# Patient Record
Sex: MALE | Race: WHITE
[De-identification: names, ages, dates, MRNs, and addresses within clinical notes are randomized per-mention and may not be internally consistent; named-entity substitution may affect disease eponyms.]

---

## 2017-04-05 ENCOUNTER — HOSPITAL ENCOUNTER (EMERGENCY)
Dept: HOSPITAL 63 - ER | Age: 32
Discharge: HOME | End: 2017-04-05
Payer: SELF-PAY

## 2017-04-05 VITALS
SYSTOLIC BLOOD PRESSURE: 127 MMHG | SYSTOLIC BLOOD PRESSURE: 127 MMHG | DIASTOLIC BLOOD PRESSURE: 65 MMHG | DIASTOLIC BLOOD PRESSURE: 65 MMHG

## 2017-04-05 DIAGNOSIS — M25.531: Primary | ICD-10-CM

## 2017-04-05 DIAGNOSIS — Z88.5: ICD-10-CM

## 2017-04-05 DIAGNOSIS — Z88.6: ICD-10-CM

## 2017-04-05 DIAGNOSIS — Z88.8: ICD-10-CM
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
## 2017-04-05 DIAGNOSIS — F17.200: ICD-10-CM
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
## 2017-04-05 DIAGNOSIS — Z88.1: ICD-10-CM
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
## 2017-04-05 DIAGNOSIS — F20.9: ICD-10-CM

## 2017-04-05 PROCEDURE — 29125 APPL SHORT ARM SPLINT STATIC: CPT

## 2017-04-05 RX ADMIN — OLANZAPINE ONE MG: 2.5 TABLET, FILM COATED ORAL at 09:15

## 2017-04-05 NOTE — ED.ADGEN
Past History


Past Medical History:  Schizophrenia, Other


Past Surgical History:  Tonsillectomy


Smoking:  Less than 1pk/day


Alcohol Use:  Occasionally


Additional Alcohol Information:  


states "it's been a long time"


Drug Use:  Other


Social History Narrative:  states he "used a little of everything", denies 

current use





Adult General


HPI


HPI





Patient is a 31-year-old male presents emergency department complaining of 

right wrist and arm pain. Patient has a history of schizophrenia states he has 

been out of his medications. He is denying any auditory hallucinations but 

reports that he frequently sees snakes in that "freaks him out." He states that 

he hurt the arm all change a tire on a truck last night. The liver of the brad 

flipped back and hit him in the right wrist. He has had no prehospital 

intervention. He is asking for dose of his olanzapine as well as a 

prescription. He states [facility] and will follow-

[facility] with [facility].





Review of Systems


Review of Systems





Constitutional: Denies fever or chills []


Eyes: Denies change in visual acuity, redness, or eye pain []


HENT: Denies nasal congestion or sore throat []


Respiratory: Denies cough or shortness of breath []


Cardiovascular: No additional information not addressed in HPI []


GI: Denies abdominal pain, nausea, vomiting, bloody stools or diarrhea []


: Denies dysuria or hematuria []


Musculoskeletal: Denies back pain or joint pain []


Integument: Denies rash or skin lesions []


Neurologic: Denies headache, focal weakness or sensory changes []


Endocrine: Denies polyuria or polydipsia []





Current Medications


Current Medications





 Current Medications








 Medications


  (Trade)  Dose


 Ordered  Sig/Domingo  Start Time


 Stop Time Status Last Admin


Dose Admin


 


 Olanzapine


  (Zyprexa Zydis)  10 mg  1X  ONCE  4/5/17 09:30


 4/5/17 09:31 DC  


 


 


 Olanzapine


  (Zyprexa)  10 mg  1X  ONCE  4/5/17 11:15


 4/5/17 11:16 DC 4/5/17 09:15


10 MG











Allergies


Allergies





 Allergies








Coded Allergies Type Severity Reaction Last Updated Verified


 


  Haloperidol Lactate Allergy Unknown swelling 4/16/16 Yes


 


  haloperidol Allergy Unknown swelling 4/16/16 Yes


 


  sulfamethoxazole Allergy Unknown hives 4/16/16 Yes


 


  tramadol Allergy Unknown swelling 4/16/16 Yes


 


  trimethoprim Allergy Unknown hives 4/16/16 Yes


 


Uncoded Allergies Type Severity Reaction Last Updated Verified


 


  narcotics Allergy Unknown  4/5/17 











Physical Exam


Physical Exam





Constitutional: Well developed, well nourished, no acute distress, non-toxic 

appearance. []


HENT: Normocephalic, atraumatic, bilateral external ears normal, oropharynx 

moist, no oral exudates, nose normal. []


Eyes: PERRLA, EOMI, conjunctiva normal, no discharge. [] 


Neck: Normal range of motion, no tenderness, supple, no stridor. [] 


Cardiovascular:Heart rate regular rhythm, no murmur []


Lungs & Thorax:  Bilateral breath sounds clear to auscultation []


Abdomen: Bowel sounds normal, soft, no tenderness, no masses, no pulsatile 

masses. [] 


Skin: Warm, dry, no erythema, no rash. [] 


Back: No tenderness, no CVA tenderness. [] 


Extremities: Right wrist is tender to palpation with associated edema and 

questionable deformity, no cyanosis, no clubbing, ROM intact. [] 


Neurologic: Alert and oriented X 3, normal motor function, normal sensory 

function, no focal deficits noted. []


Psychologic: Affect normal, judgement normal, mood normal. []





Current Patient Data


Vital Signs





 Vital Signs








  Date Time  Temp Pulse Resp B/P Pulse Ox O2 Delivery O2 Flow Rate FiO2


 


4/5/17 08:17 97.9 122 24  96 Room Air  











EKG


EKG


[]





Radiology/Procedures


Radiology/Procedures


[]





Course & Med Decision Making


Course & Med Decision Making


Pertinent Labs and Imaging studies reviewed. (See chart for details)


The patient was initially asking for an x-ray of the right forearm but then 

became concerned about the "radon poisoning" and refused to get the x-ray. He 

did get a dose of Zyprexa here. I did send him home with prescription for the 

same. I encouraged and return emergency department if he develops any new or 

worsening symptoms or feel like to have it reexamined


[]





Final Impression


Final Impression


Right wrist pain 


Schizophrenia []


Problems:  





Dragon Disclaimer


Dragon Disclaimer


This electronic medical record was generated, in whole or in part, using a 

voice recognition dictation system.








LAURA CASTANEDA MD Apr 5, 2017 12:51

## 2017-04-07 ENCOUNTER — HOSPITAL ENCOUNTER (EMERGENCY)
Dept: HOSPITAL 63 - ER | Age: 32
Discharge: HOME | End: 2017-04-07
Payer: SELF-PAY

## 2017-04-07 DIAGNOSIS — F17.200: ICD-10-CM

## 2017-04-07 DIAGNOSIS — Z88.6: ICD-10-CM

## 2017-04-07 DIAGNOSIS — Z88.5: ICD-10-CM

## 2017-04-07 DIAGNOSIS — Z88.1: ICD-10-CM

## 2017-04-07 DIAGNOSIS — F20.9: Primary | ICD-10-CM

## 2017-04-07 DIAGNOSIS — Z88.8: ICD-10-CM

## 2017-04-07 PROCEDURE — 99284 EMERGENCY DEPT VISIT MOD MDM: CPT

## 2017-04-07 NOTE — ED.ADGEN
Past History


Past Medical History:  Schizophrenia, Other


Past Surgical History:  Tonsillectomy


Smoking:  Less than 1pk/day


Alcohol Use:  Occasionally


Drug Use:  Other





Adult General


Chief Complaint


Chief Complaint


lost prescription





HPI


HPI





Patient is a 31 year old male who presents requesting medication refill.  has a 

h/o schizophrenia, ran out of his zyprexa.  Seen in ED recently and given RX 

but lost the RX.  Denies SI/HI, intermittent auditory hallucinations.  Pt has a 

psychiatrist.





Review of Systems


Review of Systems





Constitutional: Denies fever or chills []


Eyes: Denies change in visual acuity, redness, or eye pain []


HENT: Denies nasal congestion or sore throat []


Respiratory: Denies cough or shortness of breath []


Cardiovascular: No chest pain


GI: Denies abdominal pain, nausea, vomiting, bloody stools or diarrhea []


: Denies dysuria or hematuria []


Musculoskeletal: Denies back pain or joint pain []


Integument: Denies rash or skin lesions []


Neurologic: Denies headache, focal weakness or sensory changes []





Current Medications


Current Medications





 Current Medications








 Medications


  (Trade)  Dose


 Ordered  Sig/Domingo  Start Time


 Stop Time Status Last Admin


Dose Admin


 


 Olanzapine


  (Zyprexa)  10 mg  1X  ONCE  4/7/17 16:00


 4/7/17 16:01 UNV  


 











Allergies


Allergies





 Allergies








Coded Allergies Type Severity Reaction Last Updated Verified


 


  Haloperidol Lactate Allergy Unknown swelling 4/16/16 Yes


 


  haloperidol Allergy Unknown swelling 4/16/16 Yes


 


  sulfamethoxazole Allergy Unknown hives 4/16/16 Yes


 


  tramadol Allergy Unknown swelling 4/16/16 Yes


 


  trimethoprim Allergy Unknown hives 4/16/16 Yes


 


Uncoded Allergies Type Severity Reaction Last Updated Verified


 


  narcotics Allergy Unknown  4/5/17 











Physical Exam


Physical Exam





Constitutional: Well developed, well nourished, no acute distress, non-toxic 

appearance. hyperverbal, laughing, happy


HENT: Normocephalic, atraumatic, bilateral external ears normal, oropharynx 

moist, no oral exudates, nose normal. []


Eyes: PERRLA, EOMI, conjunctiva normal, no discharge. [] 


Neck: Normal range of motion, no tenderness, supple, no stridor. [] 


Cardiovascular:Heart rate mild tachy with regular rhythm, no murmur []


Lungs & Thorax:  Bilateral breath sounds clear to auscultation , no wheeze or 

crackles


Extremities: No tenderness, no cyanosis, no clubbing, ROM intact, no edema. [] 


Neurologic: Alert and oriented X 3, normal motor function, normal sensory 

function, no focal deficits noted. []


Psychologic:hyperverbal but no internal stimuli noted, pt is NOT SI or HI





EKG


EKG


[]





Radiology/Procedures


Radiology/Procedures


[]





Course & Med Decision Making


Course & Med Decision Making


Pertinent Labs and Imaging studies reviewed. (See chart for details)





pt given a dose of zyprexa here, given RX for 20 more days and encouraged to f/

u with psychiatrist.





Final Impression


Final Impression


schizophrenia[]


Problems:  





Dragon Disclaimer


Dragon Disclaimer


This electronic medical record was generated, in whole or in part, using a 

voice recognition dictation system.








SACHA SCHAEFER MD Apr 7, 2017 16:09

## 2017-04-08 ENCOUNTER — HOSPITAL ENCOUNTER (EMERGENCY)
Dept: HOSPITAL 63 - ER | Age: 32
Discharge: HOME | End: 2017-04-08
Payer: SELF-PAY

## 2017-04-08 VITALS — DIASTOLIC BLOOD PRESSURE: 60 MMHG | SYSTOLIC BLOOD PRESSURE: 110 MMHG

## 2017-04-08 DIAGNOSIS — Z88.6: ICD-10-CM

## 2017-04-08 DIAGNOSIS — Z88.8: ICD-10-CM

## 2017-04-08 DIAGNOSIS — F17.200: ICD-10-CM

## 2017-04-08 DIAGNOSIS — R07.89: Primary | ICD-10-CM

## 2017-04-08 DIAGNOSIS — Z88.1: ICD-10-CM

## 2017-04-08 DIAGNOSIS — Z88.5: ICD-10-CM

## 2017-04-08 DIAGNOSIS — M54.2: ICD-10-CM

## 2017-04-08 PROCEDURE — 99281 EMR DPT VST MAYX REQ PHY/QHP: CPT

## 2017-04-08 NOTE — ED.ADGEN
Past History


Past Medical History:  Other


Past Surgical History:  Other


Smoking:  Less than 1pk/day


Alcohol Use:  Occasionally


Drug Use:  Other





Adult General


Chief Complaint


Chief Complaint


flank pain





HPI


HPI


Patient is a 31-year-old male with history of schizophrenia presents with right 

anterior lower chest wall pain. Symptoms are 2 days ago. Patient denies oral 

surgery. Pain is worse palpation and movement. Patient also complains of neck 

pain. Denies any pain or injury. Patient's been seen in this ER multiple times 

in the past 24 hours. He checked in early this morning but left after refusing 

to allow staff members. Vital signs. Later this morning, the patient walked to 

the [facility] was brought to the ED for days complaint. Denies fever, cough, 

sore throat abdominal and back pain. Patient denies drug use. Reports 

compliance medication. He does not wish to share worries currently living. 

History is limited due to poor cooperation.





Review of Systems


Review of Systems


Review symptoms as per history of present illness. All other review symptoms 

are negative.





Allergies


Allergies





 Allergies








Coded Allergies Type Severity Reaction Last Updated Verified


 


  Haloperidol Lactate Allergy Unknown swelling 4/16/16 Yes


 


  haloperidol Allergy Unknown swelling 4/16/16 Yes


 


  sulfamethoxazole Allergy Unknown hives 4/16/16 Yes


 


  tramadol Allergy Unknown swelling 4/16/16 Yes


 


  trimethoprim Allergy Unknown hives 4/16/16 Yes


 


Uncoded Allergies Type Severity Reaction Last Updated Verified


 


  narcotics Allergy Unknown  4/5/17 











Physical Exam


Physical Exam





Constitutional: Well developed, well nourished, no acute distress, non-toxic 

appearance.


HENT: Normocephalic, atraumatic, bilateral external ears normal, oropharynx 

moist, no oral exudates, nose normal.


Eyes: PERRLA, EOMI, conjunctiva normal. 


Neck: Normal range of motion, no tenderness.


Cardiovascular:Heart rate regular rhythm, no murmur.


Lungs & Thorax:  Bilateral breath sounds clear to auscultation. Right lower 

anterior rib angle pain, tenderness, no deformity bruising subcutaneous 

emphysema or crepitus.


Abdomen: Bowel sounds normal, soft, no tenderness. 


Skin: Warm, dry, no erythema, no rash.  


Back: No tenderness, no CVA tenderness. [


Extremities: No tenderness, no cyanosis, no clubbing, ROM intact, no edema. 


Neurologic: Alert and oriented X person and place, tangential and pressured 

speech


Psychologic: Affect normal, judgement normal, mood normal. []





Current Patient Data


Vital Signs





 Vital Signs








  Date Time  Temp Pulse Resp B/P Pulse Ox O2 Delivery O2 Flow Rate FiO2


 


4/8/17 07:07 97.3 110 18  98 Room Air  











EKG


EKG


[]





Radiology/Procedures


Radiology/Procedures


[]


Impressions:


Chest wall pain





Course & Med Decision Making


Course & Med Decision Making


Pertinent Labs and Imaging studies reviewed. (See chart for details)





[Reproducible chest wall pain]





Final Impression


Final Impression


[1. Chest wall pain]


Problems:  





Dragon Disclaimer


Dragon Disclaimer


This electronic medical record was generated, in whole or in part, using a 

voice recognition dictation system.








LYDIA JAMA DO Apr 8, 2017 14:51

## 2018-11-04 ENCOUNTER — HOSPITAL ENCOUNTER (EMERGENCY)
Dept: HOSPITAL 63 - ER | Age: 33
Discharge: HOME | End: 2018-11-04
Payer: SELF-PAY

## 2018-11-04 VITALS — WEIGHT: 150 LBS | HEIGHT: 68 IN | BODY MASS INDEX: 22.73 KG/M2

## 2018-11-04 VITALS — DIASTOLIC BLOOD PRESSURE: 78 MMHG | SYSTOLIC BLOOD PRESSURE: 140 MMHG

## 2018-11-04 DIAGNOSIS — Z88.1: ICD-10-CM

## 2018-11-04 DIAGNOSIS — Z71.1: Primary | ICD-10-CM

## 2018-11-04 DIAGNOSIS — F15.10: ICD-10-CM

## 2018-11-04 DIAGNOSIS — F20.9: ICD-10-CM

## 2018-11-04 DIAGNOSIS — R20.8: ICD-10-CM

## 2018-11-04 DIAGNOSIS — Z88.8: ICD-10-CM

## 2018-11-04 DIAGNOSIS — Z88.2: ICD-10-CM

## 2018-11-04 DIAGNOSIS — F17.200: ICD-10-CM

## 2018-11-04 PROCEDURE — 99283 EMERGENCY DEPT VISIT LOW MDM: CPT

## 2018-11-04 NOTE — ED.ADGEN
Past History


Past Medical History:  Schizophrenia, Other


Past Surgical History:  Other


Smoking:  Less than 1pk/day


Alcohol Use:  Occasionally


Drug Use:  Methamphetamine, Other





Adult General


Chief Complaint


Chief Complaint


Hallucinations





HPI


HPI


Patient is a 33-year-old male with history of schizophrenia and methamphetamine 

abuse who presents with popping and cracking sensation in his joints. Patient 

last slept some 36 hours ago after methamphetamine binge. States he is been 

noncompliant with his psychiatric medications. No HI, SI. Patient was were 

reviewed for his current complaint at [facility] earlier this 

morning. States he was told that he needs to go home and sleep and that his 

labs were normal. No other acute symptoms or complaints[]





Review of Systems


Review of Systems


Review symptoms as per history of present illness.


All other systems were reviewed and found to be within normal limits, except as 

documented in this note.





Allergies


Allergies





Allergies








Coded Allergies Type Severity Reaction Last Updated Verified


 


  Haloperidol Lactate Allergy Unknown swelling 4/16/16 Yes


 


  haloperidol Allergy Unknown swelling 4/16/16 Yes


 


  sulfamethoxazole Allergy Unknown hives 4/16/16 Yes


 


  tramadol Allergy Unknown swelling 4/16/16 Yes


 


  trimethoprim Allergy Unknown hives 4/16/16 Yes


 


Uncoded Allergies Type Severity Reaction Last Updated Verified


 


  narcotics Allergy Unknown  4/5/17 











Physical Exam


Physical Exam





Constitutional: Well developed, well nourished, no acute distress, non-toxic 

appearance. []


HENT: Normocephalic, atraumatic, bilateral external ears normal, oropharynx 

moist, no oral exudates, nose normal. []


Eyes: PERRLA, EOMI, conjunctiva normal, no discharge. [] 


Neck: Normal range of motion, no tenderness, supple, no stridor. [] 


Cardiovascular:Heart rate regular rhythm, no murmur []


Lungs & Thorax:  Bilateral breath sounds clear to auscultation []


Abdomen: Bowel sounds normal, soft, no tenderness, no masses, no pulsatile 

masses. [] 


Skin: Warm, dry, no erythema, no rash. [] 


Back: No tenderness, no CVA tenderness. [] 


Extremities: No tenderness, no cyanosis, no clubbing, ROM intact, no edema. [] 


Neurologic: Alert and oriented X 3, normal motor function, normal sensory 

function, no focal deficits noted. []


Psychologic: Affect normal, anxious. No HI or SI.[]





Current Patient Data


Vital Signs





 Vital Signs








  Date Time  Temp Pulse Resp B/P (MAP) Pulse Ox O2 Delivery O2 Flow Rate FiO2


 


11/4/18 14:50 98.5 98 20  100 Room Air  











EKG


EKG


[]





Radiology/Procedures


Radiology/Procedures


[]





Course & Med Decision Making


Course & Med Decision Making


Pertinent Labs and Imaging studies reviewed. (See chart for details)





[Sleep-induced versus drug-induced versus psychiatric psychosis. 

Recommendations are resume please follow-up recommended.]





Final Impression


Final Impression


[#1 Person with feared complaint in whom no diagnosis was made]





Dragon Disclaimer


Dragon Disclaimer


This electronic medical record was generated, in whole or in part, using a 

voice recognition dictation system.











LYDIA JAMA DO Nov 4, 2018 15:03